# Patient Record
(demographics unavailable — no encounter records)

---

## 2024-10-18 NOTE — DISCUSSION/SUMMARY
[FreeTextEntry1] : Mental Health History Today's Date  10- Patient Date of Birth 1975-08-30 Time Spent  75 minutes  History of past mental health treatment Have you ever been in mental health treatment in the past?   No  Psychotropic Medication History Have you ever been prescribed a psychotropic medication (i.e. SSRI, benzo, mood stabilizer, anti-psychotic, etc.)?  Yes Comments Patient is currently prescribed Lexapro 20mg (1 tab daily) to manage symptoms of anxiety. Patient reports adherence to this medication.   Past Suicidality Previous attempt (ever in lifetime) None Known  C-SSR Screener (lifetime/recent) Passive Ideation Have you wished you were dead or wished you could go to sleep and not wake up?  No  Active Ideation Have you actually had any thoughts of killing yourself?  No  Suicidal Behavior Have you ever done anything, started to do anything, or prepared to do anything to end your life?  No  Additional details of suicidal ideation or suicidal behavior not mentioned in HPI. (include collateral information) Firearm Safety Do you have access to lethal means? No  History of Violence Have you ever engaged in physically violent behavior towards others or destruction of property?  No  Family History Is there a history of mental health concerns in your family?  No  Substance Abuse SAURABH History Have you ever had a problem with alcohol or drugs?    No  Treatment History Did you ever participate in any type SAURABH treatment? (Inpatient Rehab or Detox, Outpatient Treatment, Medication Assisted Treatment) No  Medical History Do you suffer from any chronic medical conditions?  Yes Comments Patient with a history of diabetes, hypertension, and hyperlipidemia.   Do you suffer from chronic pain?  No  Social History (Adult) Primary Language Patient's Primary Language  English  Living Situation Living Situation Status  Spouse Other Details Patient domiciled with spouse and 11 y/o son.   Caregiver Status Do you serve as primary caregiver for any children or adults?  None  Gender Identity Patient's Gender Identity Female  Sexual Orientation Patient's Sexual Orientation Heterosexual  Relationship Status Patient's Relationship Status   Trauma Patient Trauma  None Reported  Employment Status Are you currently employed?  Employed Comments Patient is employed as an  at [employer] which is a mental health non-profit organization. 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   SDOH SDOH Concerns None  History of Present Illness Current Concerns/Chief Complaints  Patient is a 50 y/o female presenting with symptoms of anxiety and depression. Patient reports symptoms of anxiety to have exacerbated in recent months. Patient reports the increased anxiety to be the result of a toxic work situation which lasted about a year and half.  Patient reports being prescribed Lexapro to manage symptoms of anxiety. Patient has since located new employment and reports satisfaction with her current place of employment. Patient also reports increased stress at home as she is doing home renovations and reports that she is becoming easily frustrated. Patient does reports support from her spouse. Patient also reports difficulty with focus dating back to childhood. Patient reports working hard academically however she did not obtain the desired grades. Patient also reports difficulty with focus when reading and finds it challenging to get from point A to point B. Additionally patient reports compulsive shopping and states she buys art supplies and clothing. Patient expressed interest in connecting to psychiatry to further assess for ADHD and to obtain medication to further manage symptoms.   Current Symptom screening a. Depressive symptoms  {PHQ-9  }- Writer administered PHQ-9 screening. Patient scored a 13 indicating patient meets diagnostic criteria for moderate depression.  b. SI/Safety- Writer assessed for safety. There are no safety concerns at this time.  c. Anxiety symptoms  {YAZMIN-7  }- Writer administered YAZMIN-7 screening. Patient scored a 17 indicating patient meets diagnostic criteria for moderate to severe anxiety.  d. Bella- None reported.  e. Psychosis- None reported.  f. SAURABH- None reported.  g. ADHD/Disruptive Behavior-Patient to obtain consult for ADHD diagnosis.   Initial Care Plan Specify Problems  Anxiety Depression ADHD   Specify Interventions  Telepsychiatry Consult Care Coordination  Intervention Details Patient presenting with symptoms of anxiety, depression, and ADHD. Patient will benefit from psych consult to further evaluate and diagnose ADHD and linkage to ADHD providers. Writer to provide referrals and will assist with linkage as needed. Patient is in agreement.

## 2024-10-23 NOTE — RISK ASSESSMENT
[Clinical Records] : Clinical Records [Clinical Interview] : Clinical Interview [No] : No [None in the patient's lifetime] : None in the patient's lifetime [None Known] : none known

## 2024-10-24 NOTE — PAST MEDICAL HISTORY
[FreeTextEntry1] : PCP: Dr. Nava/Dr. Wilkerson  Hx of TBI: denied  Hx of Seizures: denied  Hx of Migraine HA: denied (gets stress headaches, a few times per month    Relevant Labs/EKG: Reviewed

## 2024-10-24 NOTE — SURGICAL HISTORY
[FreeTextEntry1] : Reviewed.  --as young child had surgery during which part of her left quad muscle was removed, does not recall details

## 2024-10-24 NOTE — FAMILY HISTORY
[FreeTextEntry1] :  Psychiatric family history: -Suicide: denied  -ADHD: cousin, maternal aunt -Mood disorders: denied  -Psychotic disorders: denied  -Substance use disorders: denied

## 2024-10-24 NOTE — PHYSICAL EXAM
[None] : none [Average] : average [Cooperative] : cooperative [Full] : full [Clear] : clear [Linear/Goal Directed] : linear/goal directed [WNL] : within normal limits [Anxious] : anxious [de-identified] : anxious

## 2024-10-24 NOTE — PHYSICAL EXAM
[None] : none [Average] : average [Cooperative] : cooperative [Full] : full [Clear] : clear [Linear/Goal Directed] : linear/goal directed [WNL] : within normal limits [Anxious] : anxious [de-identified] : anxious

## 2024-10-24 NOTE — REASON FOR VISIT
[Continuity of care] : to ensure continuity of care [Telehealth (audio & video) - Individual/Group] : This visit was provided via telehealth using real-time 2-way audio visual technology. [Other Location: e.g. Home (Enter Location, City,State)___] : The provider was located at [unfilled]. [Home] : The patient, [unfilled], was located at home, [unfilled], at the time of the visit. [Participant(s) identity verified] : Participant(s) identity verified. [Verbal consent obtained from patient/other participant(s)] : Verbal consent for telehealth/telephonic services obtained from patient/other participant(s) [Primary Care] : Primary Care [[facility] Provider/Facility] : [facility] Provider/Facility [FreeTextEntry4] : 13:00 [FreeTextEntry5] : 14:00 [FreeTextEntry2] : I met with a patient who endorses symptoms of ADHD. Patient reports struggling in school throughout her childhood due to difficulty with focus and retention of information. Patient reports symptoms to have worsened as she is aging and finds she is having challenges at work and with completing tasks at home. The patient is on medication (Lexapro 20mg 1 tab daily)  for anxiety however still has difficulty throughout the day. Patient expressed interest in ADHD assessment. [FreeTextEntry1] : telepsych consult via collaborative care program

## 2024-10-24 NOTE — SOCIAL HISTORY
[FreeTextEntry1] : -[place]: Grew up in [place], [place] -Current living situation: in [place], [place] with her , her son, her father and a dog  -Parents/Siblings/Childhood: Parents were , mother  at ~65 yo from heart attack. Father worked as a horticulturist, mother worked as a teacher. No siblings, is an only child.  -Highest Level of Education: Graduated from [employer] in [place], [place], has [employer] from  [employer]   -Work History: Works as an , has been in current job since 2024.   -Romantic relationships:  to  since .  -Children: Son (13 yo) -History of Trauma: denied history of abuse  -Legal History: Patient denies recent or remote hx of legal problems. -Access to firearms: patient denies.

## 2024-10-24 NOTE — DISCUSSION/SUMMARY
[Low acute suicide risk] : Low acute suicide risk [No] : No [Not clinically indicated] : Safety Plan completed/updated (for individuals at risk): Not clinically indicated [FreeTextEntry1] : 50 yo F with history of anxiety disorder presenting at referral of PCP (Dr. Jamel Wilkerson) and  clinician (Justino Wilson LCSW) for telepsych evaluation, seeking diagnostic eval and treatment recommendations for inattention.   Cannot make (nor rule out) diagnosis of ADHD based upon today's evaluation in isolation, since patient's ongoing anxiety symptoms and psychosocial stressors are likely contributing to difficulty with focus and overwhelm/difficulty organizing herself. Though patient has been prescribed escitalopram for nearly two years, she was not taking it regularly until recently (for past 1.5 weeks). Thus, it is too soon to conclude whether or not it will be effective to treat her anxiety. As anxiety symptoms remit, it will likely become clearer whether or not a separate, discrete neurodevelopmental disorder such as ADHD is also contributing to cognitive symptoms.

## 2024-10-24 NOTE — REASON FOR VISIT
[Continuity of care] : to ensure continuity of care [Telehealth (audio & video) - Individual/Group] : This visit was provided via telehealth using real-time 2-way audio visual technology. [Other Location: e.g. Home (Enter Location, City,State)___] : The provider was located at [unfilled]. [Home] : The patient, [unfilled], was located at home, [unfilled], at the time of the visit. [Participant(s) identity verified] : Participant(s) identity verified. [Verbal consent obtained from patient/other participant(s)] : Verbal consent for telehealth/telephonic services obtained from patient/other participant(s) [Primary Care] : Primary Care [[facility] Provider/Facility] : [facility] Provider/Facility [FreeTextEntry4] : 13:00 [FreeTextEntry5] : 14:00 [FreeTextEntry2] : I met with a patient who endorses symptoms of ADHD. Patient reports struggling in school throughout her childhood due to difficulty with focus and retention of information. Patient reports symptoms to have worsened as she is aging and finds she is having challenges at work and with completing tasks at home. The patient is on medication (Lexapro 20mg 1 tab daily)  for anxiety however still has difficulty throughout the day. Patient expressed interest in ADHD assessment. [FreeTextEntry1] : telepsych consult via collaborative care program

## 2024-10-24 NOTE — DISCUSSION/SUMMARY
[Low acute suicide risk] : Low acute suicide risk [No] : No [Not clinically indicated] : Safety Plan completed/updated (for individuals at risk): Not clinically indicated [FreeTextEntry1] : 48 yo F with history of anxiety disorder presenting at referral of PCP (Dr. Jamel Wilkerson) and  clinician (Justino Wilson LCSW) for telepsych evaluation, seeking diagnostic eval and treatment recommendations for inattention.   Cannot make (nor rule out) diagnosis of ADHD based upon today's evaluation in isolation, since patient's ongoing anxiety symptoms and psychosocial stressors are likely contributing to difficulty with focus and overwhelm/difficulty organizing herself. Though patient has been prescribed escitalopram for nearly two years, she was not taking it regularly until recently (for past 1.5 weeks). Thus, it is too soon to conclude whether or not it will be effective to treat her anxiety. As anxiety symptoms remit, it will likely become clearer whether or not a separate, discrete neurodevelopmental disorder such as ADHD is also contributing to cognitive symptoms.

## 2024-10-24 NOTE — HISTORY OF PRESENT ILLNESS
[FreeTextEntry1] : 50 yo F with history of anxiety disorder presenting at referral of her PCP (Dr. Jamel Wilkerson) and  clinician Justino Wilson for telepsych consultation to evaluate difficulties patient has reported with attention and focus.    Lexapro was started by primary care about 1-2 years ago while patient was going through a bad work situation and was "having trouble calming down". Patient reports she is "always anxious", particularly about issues related to work.  In past 3-4 years she has been constantly worried about every little thing, which was also time she started in a former job that was very stressful and toxic. Prior to that had been in a job she loved but the commute was unsustainable. She was in the job for about a year before moving to a new company but was let go after about a month. Found her current job but there are aspects of the job that are "stressing [her] out beyond relief". Health insurance was very expensive through her job, had to buy insurance on the marketplace which has been inadequate for her family's needs which has been very stressful. Feels like the expectations on her are unreasonable, her boss wants "superwoman". Said they also have done a home renovation which has been stressful. Feels like every aspect of life is overwhelming and her brain is in 20 different directions. Constantly overthinks things, worries she did something wrong, leads to feeling of tightness in her chest, difficult to take a deep breath, headache, thoughts are racing. Feeling is constant until she can calm down and fix the situation. +Sleep disruption- both initiation and maintenance. About 3-4 times per week she wakes up at 3 and can't fall back asleep until 5 or 6 am which is when she needs to wake up. Appetite- takes Mounjaro which causes some nausea and affects her appetite. Said that her appetite can either be increased or suppressed when she is feeling upset. Family said she gets "snippy" when she is anxious or stressed. Sometimes when she is very stressed she wants to burst into tears and cry". Describes that she is stubborn.   Re: inattention, has been lifelong problem, said she always assumed she was being lazy. Struggled with homework, projects. Liked math because it had a concrete answer. In recent years she is struggling to read a book- said that in the past it was never a problem and should would "devour" books. Has switched to audio books which she loves because she can multitask.   Work starts between 8:30-9am, hours end at 4-4:30 but sometimes her boss asks her to stay as late as 10pm. Happens more often during their busy season at the start of the calendar year. Fears losing her job or that she will let the team down if she doesn't meet her boss' expectation. Catastrophizes about "every little thing". Has been fired at least 3 times in her life- said this has been related to issues with organization.  Misses details, does not have issues with deadlines, does not procrastinate at work, time management "could be better",   When she looks back, remembers life not being organized, never feeling like something is complete.  Elementary/Middle/High school- studied hard but got average grades (~B+), things took a while to click. Never found a good study method. Cannot recall any specific feedback or issues in class. Said she "always got an A for effort". Denied behavioral issues. Always had a couple good friends.  College- was average student (B average), also took a while to click. Had to repeat 1 or 2 college courses.    Psych Review of Systems: Denied history of persistently sad/irritable mood, denied neurovegetative symptoms.  Denied history of periods of abnormally/persistently elevated or irritable mood associated with changes in activity, energy, sleep or behavior. Denied history of persistent patterns and impairment in functioning due to inattention, hyperactivity and impulsivity. Denied history of auditory and visual hallucinations. Denied history of paranoia, delusions to their knowledge. Denied history of exposure to trauma and subsequent intrusive symptoms, changes in mood/cognition and alterations in arousal or reactivity.                [FreeTextEntry2] : -Past or Current Mental Health Providers: ---Psychopharm: Not previously evaluated by psychiatrist, meds have always been prescribed through primary care.  ---Psychotherapy: Denied -Historical Diagnoses: anxiety disorder -Prior Psychiatric Hospitalizations/ED Evaluations: denied -Past Suicide Attempts: denied -History of self-injurious behavior: denied -History of assaultive or violent behavior: denied   Substance Use History: -Nicotine: never smoker  -Alcohol: occasionally a glass of wine, about once per month at most -Cannabis: denied  -Caffeine: diet coke (1x 20 oz bottle)  -Other Illicit drugs: denied -Medical/social/legal problems related to substance use: denied -Prior Substance Use Tx: denied    [FreeTextEntry3] : Current psych medications: --escitalopram 20 mg daily (has been taking consistently for 1.5 weeks, prior to that she thought it was supposed to be taken as needed and was taking it ~3 times per week on average for past ~2 years)  Past Psych Meds: --prescribed alprazolam but never took it.

## 2024-10-24 NOTE — PLAN
[FreeTextEntry4] :  1. Generalized Anxiety Disorder --Recommend continuing escitalopram 20 mg daily, reassess in 4-6 weeks. If no improvement in anxiety can consider switching to alternative SSRI. --Consider referral to CBT   2. Inattention, r/o ADHD --Diagnostic unclear, anxiety symptoms are confounding (anxiety has also existed over lifetime, not previously treated). Recommend referral to neuropsych for additional evaluation --N.b. history of tachycardia as per review of VS, should a stimulant be recommended in the future. Last EKG appears to be from 2022, would recommend repeating EKG and defer need for further workup of tachycardia to primary care +/- cardiology.    - Recommendations have been communicated to referring providers. - I have given my usual talk about the risks and benefits of all treatment recommendations including alternatives and the risks and benefits of no treatment at all. Patient had the opportunity to have all questions answered to their satisfaction. They expressed understanding and agreement with the above treatment plan. - Educated patient of importance of remaining abstinent from drugs and alcohol. - Discussed that unpredictable effects including cardiorespiratory collapse can result from the combination of illicit drugs and prescribed medications. Patient verbalized understanding. - Emergency procedures were discussed: pt. educated to call 911 or go to nearest ER for worsening of symptoms/suicidal/homicidal ideation. - Patient given opportunity to ask questions and their questions were answered and they expressed understanding and agreement with above plan.   I spent a total of 60 minutes for today's visit in evaluating and treating the patient as per above, including preparing for patient's appointment (review of prior documents, [facility] ), performing a medically necessary exam/evaluation, communicating/counseling/educating the patient, documenting clinical information in the EHR, communicating with referring providers.

## 2024-10-24 NOTE — SOCIAL HISTORY
[FreeTextEntry1] : -[place]: Grew up in [place], [place] -Current living situation: in [place], [place] with her , her son, her father and a dog  -Parents/Siblings/Childhood: Parents were , mother  at ~63 yo from heart attack. Father worked as a horticulturist, mother worked as a teacher. No siblings, is an only child.  -Highest Level of Education: Graduated from [employer] in [place], [place], has [employer] from  [employer]   -Work History: Works as an , has been in current job since 2024.   -Romantic relationships:  to  since .  -Children: Son (13 yo) -History of Trauma: denied history of abuse  -Legal History: Patient denies recent or remote hx of legal problems. -Access to firearms: patient denies.

## 2024-10-24 NOTE — PLAN
[FreeTextEntry4] :  1. Generalized Anxiety Disorder --Recommend continuing escitalopram 20 mg daily, reassess in 4-6 weeks. If no improvement in anxiety can consider switching to alternative SSRI. --Consider referral to CBT   2. Inattention, r/o ADHD --Diagnostic unclear, anxiety symptoms are confounding (anxiety has also existed over lifetime, not previously treated). Recommend referral to neuropsych for additional evaluation --N.b. history of tachycardia as per review of VS, should a stimulant be recommended in the future. Last EKG appears to be from 2022, would recommend repeating EKG and defer need for further workup of tachycardia to primary care +/- cardiology.    - Recommendations have been communicated to referring providers. - I have given my usual talk about the risks and benefits of all treatment recommendations including alternatives and the risks and benefits of no treatment at all. Patient had the opportunity to have all questions answered to their satisfaction. They expressed understanding and agreement with the above treatment plan. - Educated patient of importance of remaining abstinent from drugs and alcohol. - Discussed that unpredictable effects including cardiorespiratory collapse can result from the combination of illicit drugs and prescribed medications. Patient verbalized understanding. - Emergency procedures were discussed: pt. educated to call 911 or go to nearest ER for worsening of symptoms/suicidal/homicidal ideation. - Patient given opportunity to ask questions and their questions were answered and they expressed understanding and agreement with above plan.   I spent a total of 60 minutes for today's visit in evaluating and treating the patient as per above, including preparing for patient's appointment (review of prior documents, [facility] ), performing a medically necessary exam/evaluation, communicating/counseling/educating the patient, documenting clinical information in the EHR, communicating with referring providers.

## 2025-03-12 NOTE — RISK ASSESSMENT
[Clinical Records] : Clinical Records [Clinical Interview] : Clinical Interview [No] : No [None in the patient's lifetime] : None in the patient's lifetime [None Known] : none known Yes